# Patient Record
(demographics unavailable — no encounter records)

---

## 2025-01-08 NOTE — HISTORY OF PRESENT ILLNESS
[de-identified] : 28 year old woman, annual follow up for CHL. History of TM perforation - discussed option for OSIA or hearing aids. Patient obtained hearing aids Aug 2024, has noticed significant improvement, however still adjusting.  Reports mild irritation in postauricular areas due to hearing device. Reports intermittent Left otalgia, associated pressure secondary to weather changes. Denies otorrhea, tinnitus, dizziness, vertigo, headaches related to ears, recent fevers or ear infections.

## 2025-01-08 NOTE — DATA REVIEWED
[de-identified] : Right: Moderately-severe rising to mild CHL. Type B tymp w/ large ECV Left: Moderately-severe rising to mild CHL to 4kHz followed by a moderately-severe loss to 8kHz. Type As tymp Type B tymp with large ECV, AD Type As tymp, AS

## 2025-07-09 NOTE — HISTORY OF PRESENT ILLNESS
[FreeTextEntry1] : 28 year old female with bilateral conductive hearing loss for the past 20 years. Patient has a history of ear and infections and TM perforation.   Aided Ears: AU Hearing Aid: Intent 1 [id number]  Right Serial Number: [id number] Left Serial Number: [id number] Receivers: Right: 0-85 Left 0-85 Domes: Right 8mm DV Left 8mm DV Accessories: , TV adapter Repair warranty : 9/6/2027 Loss and Damage Warranty: 9/6/2027 45 day trial: 9/30/2024  [FreeTextEntry8] : Patient seen for hearing aid check. Reports hearing aids have been lower in volume since last adjustment.

## 2025-07-09 NOTE — ASSESSMENT
[FreeTextEntry1] : Hearing aids visually clean. Most recent audio not much different from original programming. Changed programming to include BC values. Patient immediately happier in the office. Explained change to patient. Patient happy with today's services.   REC: HAC as needed or with annual audio

## 2025-07-14 NOTE — HISTORY OF PRESENT ILLNESS
[de-identified] :  28 year old woman, annual follow up for CHL. History of TM perforation. Now using bilateral hearing aids-setting adjusted today. Currently has left ear infection and sinus infection confirmed by urgent care-taking medrol dose pack, ofloxacin and amoxicillin. Reports current left otalgia. Denies otorrhea, tinnitus, dizziness, vertigo, headaches related to ears, recent fevers or ear infections.

## 2025-07-14 NOTE — PHYSICAL EXAM
[de-identified] : wax removed AU - small dry perf AD, AS intact [Normal] : mucosa is normal [Midline] : trachea located in midline position

## 2025-07-14 NOTE — HISTORY OF PRESENT ILLNESS
[de-identified] :  28 year old woman, annual follow up for CHL. History of TM perforation. Now using bilateral hearing aids-setting adjusted today. Currently has left ear infection and sinus infection confirmed by urgent care-taking medrol dose pack, ofloxacin and amoxicillin. Reports current left otalgia. Denies otorrhea, tinnitus, dizziness, vertigo, headaches related to ears, recent fevers or ear infections.

## 2025-07-14 NOTE — PHYSICAL EXAM
[de-identified] : wax removed AU - small dry perf AD, AS intact [Normal] : mucosa is normal [Midline] : trachea located in midline position